# Patient Record
Sex: MALE | Race: WHITE | NOT HISPANIC OR LATINO | URBAN - METROPOLITAN AREA
[De-identification: names, ages, dates, MRNs, and addresses within clinical notes are randomized per-mention and may not be internally consistent; named-entity substitution may affect disease eponyms.]

---

## 2018-11-11 ENCOUNTER — INPATIENT (INPATIENT)
Facility: HOSPITAL | Age: 63
LOS: 4 days | Discharge: HOME | DRG: 470 | End: 2018-11-16
Attending: INTERNAL MEDICINE | Admitting: INTERNAL MEDICINE
Payer: COMMERCIAL

## 2018-11-11 VITALS — HEART RATE: 100 BPM | OXYGEN SATURATION: 97 % | TEMPERATURE: 97 F | RESPIRATION RATE: 18 BRPM

## 2018-11-11 DIAGNOSIS — S72.002A FRACTURE OF UNSPECIFIED PART OF NECK OF LEFT FEMUR, INITIAL ENCOUNTER FOR CLOSED FRACTURE: ICD-10-CM

## 2018-11-11 DIAGNOSIS — I10 ESSENTIAL (PRIMARY) HYPERTENSION: ICD-10-CM

## 2018-11-11 LAB
ABO RH CONFIRMATION: SIGNIFICANT CHANGE UP
ALBUMIN SERPL ELPH-MCNC: 4.4 G/DL — SIGNIFICANT CHANGE UP (ref 3.3–5.2)
ALP SERPL-CCNC: 70 U/L — SIGNIFICANT CHANGE UP (ref 40–120)
ALT FLD-CCNC: 22 U/L — SIGNIFICANT CHANGE UP
ANION GAP SERPL CALC-SCNC: 14 MMOL/L — SIGNIFICANT CHANGE UP (ref 5–17)
APTT BLD: 30.5 SEC — SIGNIFICANT CHANGE UP (ref 27.5–36.3)
AST SERPL-CCNC: 21 U/L — SIGNIFICANT CHANGE UP
BILIRUB SERPL-MCNC: 0.2 MG/DL — LOW (ref 0.4–2)
BLD GP AB SCN SERPL QL: SIGNIFICANT CHANGE UP
BUN SERPL-MCNC: 17 MG/DL — SIGNIFICANT CHANGE UP (ref 8–20)
CALCIUM SERPL-MCNC: 9.3 MG/DL — SIGNIFICANT CHANGE UP (ref 8.6–10.2)
CHLORIDE SERPL-SCNC: 98 MMOL/L — SIGNIFICANT CHANGE UP (ref 98–107)
CO2 SERPL-SCNC: 25 MMOL/L — SIGNIFICANT CHANGE UP (ref 22–29)
CREAT SERPL-MCNC: 0.79 MG/DL — SIGNIFICANT CHANGE UP (ref 0.5–1.3)
GLUCOSE SERPL-MCNC: 120 MG/DL — HIGH (ref 70–115)
HCT VFR BLD CALC: 42.1 % — SIGNIFICANT CHANGE UP (ref 42–52)
HGB BLD-MCNC: 14.3 G/DL — SIGNIFICANT CHANGE UP (ref 14–18)
INR BLD: 0.95 RATIO — SIGNIFICANT CHANGE UP (ref 0.88–1.16)
LYMPHOCYTES # BLD AUTO: 4 % — LOW (ref 20–55)
MCHC RBC-ENTMCNC: 32.1 PG — HIGH (ref 27–31)
MCHC RBC-ENTMCNC: 34 G/DL — SIGNIFICANT CHANGE UP (ref 32–36)
MCV RBC AUTO: 94.4 FL — HIGH (ref 80–94)
MONOCYTES NFR BLD AUTO: 1 % — LOW (ref 3–10)
NEUTROPHILS NFR BLD AUTO: 95 % — HIGH (ref 37–73)
PLAT MORPH BLD: NORMAL — SIGNIFICANT CHANGE UP
PLATELET # BLD AUTO: 194 K/UL — SIGNIFICANT CHANGE UP (ref 150–400)
POTASSIUM SERPL-MCNC: 4.1 MMOL/L — SIGNIFICANT CHANGE UP (ref 3.5–5.3)
POTASSIUM SERPL-SCNC: 4.1 MMOL/L — SIGNIFICANT CHANGE UP (ref 3.5–5.3)
PROT SERPL-MCNC: 6.9 G/DL — SIGNIFICANT CHANGE UP (ref 6.6–8.7)
PROTHROM AB SERPL-ACNC: 10.9 SEC — SIGNIFICANT CHANGE UP (ref 10–12.9)
RBC # BLD: 4.46 M/UL — LOW (ref 4.6–6.2)
RBC # FLD: 12.1 % — SIGNIFICANT CHANGE UP (ref 11–15.6)
RBC BLD AUTO: NORMAL — SIGNIFICANT CHANGE UP
SODIUM SERPL-SCNC: 137 MMOL/L — SIGNIFICANT CHANGE UP (ref 135–145)
TYPE + AB SCN PNL BLD: SIGNIFICANT CHANGE UP
WBC # BLD: 13.7 K/UL — HIGH (ref 4.8–10.8)
WBC # FLD AUTO: 13.7 K/UL — HIGH (ref 4.8–10.8)

## 2018-11-11 PROCEDURE — 72192 CT PELVIS W/O DYE: CPT | Mod: 26

## 2018-11-11 PROCEDURE — 73502 X-RAY EXAM HIP UNI 2-3 VIEWS: CPT | Mod: 26,LT

## 2018-11-11 PROCEDURE — 76377 3D RENDER W/INTRP POSTPROCES: CPT | Mod: 26

## 2018-11-11 PROCEDURE — 71045 X-RAY EXAM CHEST 1 VIEW: CPT | Mod: 26

## 2018-11-11 PROCEDURE — 99285 EMERGENCY DEPT VISIT HI MDM: CPT

## 2018-11-11 PROCEDURE — 99222 1ST HOSP IP/OBS MODERATE 55: CPT

## 2018-11-11 PROCEDURE — 93010 ELECTROCARDIOGRAM REPORT: CPT

## 2018-11-11 RX ORDER — SODIUM CHLORIDE 9 MG/ML
1000 INJECTION, SOLUTION INTRAVENOUS
Qty: 0 | Refills: 0 | Status: DISCONTINUED | OUTPATIENT
Start: 2018-11-12 | End: 2018-11-13

## 2018-11-11 RX ORDER — MORPHINE SULFATE 50 MG/1
4 CAPSULE, EXTENDED RELEASE ORAL ONCE
Qty: 0 | Refills: 0 | Status: DISCONTINUED | OUTPATIENT
Start: 2018-11-11 | End: 2018-11-11

## 2018-11-11 RX ORDER — ENOXAPARIN SODIUM 100 MG/ML
40 INJECTION SUBCUTANEOUS ONCE
Qty: 0 | Refills: 0 | Status: COMPLETED | OUTPATIENT
Start: 2018-11-11 | End: 2018-11-11

## 2018-11-11 RX ORDER — LOSARTAN/HYDROCHLOROTHIAZIDE 100MG-25MG
0 TABLET ORAL
Qty: 0 | Refills: 0 | COMMUNITY

## 2018-11-11 RX ORDER — CEFAZOLIN SODIUM 1 G
2000 VIAL (EA) INJECTION ONCE
Qty: 0 | Refills: 0 | Status: COMPLETED | OUTPATIENT
Start: 2018-11-12 | End: 2018-11-12

## 2018-11-11 RX ORDER — ONDANSETRON 8 MG/1
4 TABLET, FILM COATED ORAL EVERY 6 HOURS
Qty: 0 | Refills: 0 | Status: DISCONTINUED | OUTPATIENT
Start: 2018-11-11 | End: 2018-11-13

## 2018-11-11 RX ORDER — HYDROCHLOROTHIAZIDE 25 MG
25 TABLET ORAL DAILY
Qty: 0 | Refills: 0 | Status: DISCONTINUED | OUTPATIENT
Start: 2018-11-11 | End: 2018-11-13

## 2018-11-11 RX ORDER — LOSARTAN POTASSIUM 100 MG/1
100 TABLET, FILM COATED ORAL DAILY
Qty: 0 | Refills: 0 | Status: DISCONTINUED | OUTPATIENT
Start: 2018-11-11 | End: 2018-11-13

## 2018-11-11 RX ORDER — MORPHINE SULFATE 50 MG/1
2 CAPSULE, EXTENDED RELEASE ORAL EVERY 4 HOURS
Qty: 0 | Refills: 0 | Status: DISCONTINUED | OUTPATIENT
Start: 2018-11-11 | End: 2018-11-13

## 2018-11-11 RX ORDER — SODIUM CHLORIDE 9 MG/ML
3 INJECTION INTRAMUSCULAR; INTRAVENOUS; SUBCUTANEOUS EVERY 8 HOURS
Qty: 0 | Refills: 0 | Status: DISCONTINUED | OUTPATIENT
Start: 2018-11-11 | End: 2018-11-13

## 2018-11-11 RX ADMIN — ENOXAPARIN SODIUM 40 MILLIGRAM(S): 100 INJECTION SUBCUTANEOUS at 20:23

## 2018-11-11 RX ADMIN — MORPHINE SULFATE 4 MILLIGRAM(S): 50 CAPSULE, EXTENDED RELEASE ORAL at 17:38

## 2018-11-11 RX ADMIN — SODIUM CHLORIDE 3 MILLILITER(S): 9 INJECTION INTRAMUSCULAR; INTRAVENOUS; SUBCUTANEOUS at 22:12

## 2018-11-11 NOTE — ED ADULT NURSE NOTE - OBJECTIVE STATEMENT
Pt. presents with L. hip pain and limited ROM. Pt. was walking down wooden steps when the step broke and gave way, pt. fell on L. side and broke fall with L. hip. Pt. was unable to stand up and family called EMS. Denies head injury, LOC, syncope. Pt. is able to move toes and foot, unable to bear weight on L. leg, unable to bend leg without pain, leg extended pt. does not have pain. Pt. denies numbness, tingling in L. leg. Denies blood thinners.

## 2018-11-11 NOTE — CONSULT NOTE ADULT - SUBJECTIVE AND OBJECTIVE BOX
Pt Name: SB SAEED    MRN: [id number]      Patient is a 63y Male presenting to the emergency department with a chief complaint of pain to the left hip s/p fall.  Pt states he fell through rotted wooden step landing on his left hip.  Unable to weight bear.  Denies numbness/tingling.  Denies back pain, neck pain.  No other injuries.      .  REVIEW OF SYSTEMS      General:	    Skin/Breast: no rashes, no lacerations, no abrasions    Respiratory and Thorax: no difficulty breathing  	  Cardiovascular:	no chest pain    Gastrointestinal:	no abdominal pain, no nausea/vomiting    Musculoskeletal:	 see hpi    Neurological:	see hpi      ROS is otherwise negative.    PAST MEDICAL & SURGICAL HISTORY:  Hypertension  No significant past surgical history      Allergies: penicillin (Unknown)      Medications:  losartan/hydrochlorothiazide    FAMILY HISTORY:  : non-contributory    Social History: denies tobacco use, etoh    Ambulation: Walking independently [x ] With Cane [ ] With Walker [ ]  Bedbound [ ]                           14.3   13.7  )-----------( 194      ( 11 Nov 2018 17:33 )             42.1     11-11    137  |  98  |  17.0  ----------------------------<  120<H>  4.1   |  25.0  |  0.79    Ca    9.3      11 Nov 2018 17:33    TPro  6.9  /  Alb  4.4  /  TBili  0.2<L>  /  DBili  x   /  AST  21  /  ALT  22  /  AlkPhos  70  11-11      PHYSICAL EXAM:    Vital Signs Last 24 Hrs  T(C): 36.5 (11 Nov 2018 15:19), Max: 36.5 (11 Nov 2018 15:19)  T(F): 97.7 (11 Nov 2018 15:19), Max: 97.7 (11 Nov 2018 15:19)  HR: 91 (11 Nov 2018 15:19) (91 - 91)  BP: 135/86 (11 Nov 2018 15:19) (135/86 - 135/86)  BP(mean): --  RR: 16 (11 Nov 2018 15:19) (16 - 16)  SpO2: 99% (11 Nov 2018 15:19) (99% - 99%)  Daily Height in cm: 182.88 (11 Nov 2018 15:19)    Daily     Appearance: Alert, responsive, in no acute distress.    Neurological: Sensation is grossly intact to light touch. 5/5 motor function of all extremities. No focal deficits or weaknesses found.    Skin: no rash on visible skin. Skin is clean, dry and intact. No bleeding. No abrasions. No ulcerations.  LLE: no obvious deformity, +dorsiflexion, no weakness noted, toe movement intact, sensation intact, cap refill brisk, rom to left hip not tested secondary to pain    Vascular: 2+ distal pulses. Cap refill < 2 sec. No signs of venous insuffiency or stasis. No extremity ulcerations. No cyanosis.        Imaging Studies: left hip/pelvis xray:  Left femoral neck fx    A/P:  Pt is a  63y Male with left femoral neck fx    PLAN:   *CT scan left hip   * Admit to medicine service  * NPO for OR tomorrow  * IV fluids ordered and to start once NPO  * Pre-operative ABX ordered  *Routine daily anticoagulation held for OR  * Single dose anticoaguation ordered  * Medical clearance requested for procedure  discussed with Dr. Fierro  * Bed rest

## 2018-11-11 NOTE — ED ADULT NURSE REASSESSMENT NOTE - NS ED NURSE REASSESS COMMENT FT1
spoke with Timmy christina and ok to given Lovenox now
verbal report given to Rubi in holding room and patient transferred to CDU 3L
patient rec'd at this time patient alert and coopertive sl to left ac intact. postive pedal pulse toes warm and mobile.  family at bedside and aware that patient having surgery in am. food provided by family v/s taken and charted will monitor and chart changes

## 2018-11-11 NOTE — ED ADULT NURSE NOTE - NSIMPLEMENTINTERV_GEN_ALL_ED
Implemented All Fall Risk Interventions:  [place] to call system. Call bell, personal items and telephone within reach. Instruct patient to call for assistance. Room bathroom lighting operational. Non-slip footwear when patient is off stretcher. Physically safe environment: no spills, clutter or unnecessary equipment. Stretcher in lowest position, wheels locked, appropriate side rails in place. Provide visual cue, wrist band, yellow gown, etc. Monitor gait and stability. Monitor for mental status changes and reorient to person, place, and time. Review medications for side effects contributing to fall risk. Reinforce activity limits and safety measures with patient and family.

## 2018-11-11 NOTE — H&P ADULT - HISTORY OF PRESENT ILLNESS
64 y/o male with history of controlled HTN. Patient was in his usual state of health until earlier today he was walking off his deck and his left foot fell through a rotted step causing him to fall and land on his left side. He had immediate pain and inability to walk due to pain on left side. he did not hit his head and had no other injuries. In the ED he was found to have left femoral neck fx. Denies any recent illnesses, fever, chills, N/V, SOB. Has normal exercise tolerance. Seen by Orthopedic service and will need OR repair of fracture.

## 2018-11-11 NOTE — ED ADULT TRIAGE NOTE - CHIEF COMPLAINT QUOTE
Pt brought in by ambulance from home s/p fall. Pt states that the step he was standing on broke under him and fell down two additional steps. Pt denies any head trauma or LOC. No blood thinners. Unable to weight bear on left thigh and hip pain s/p fall.

## 2018-11-11 NOTE — ED PROVIDER NOTE - OBJECTIVE STATEMENT
64 yo male hx of HTN only presents with acute 10/10 left hip pain, aching, +radiating down left leg, +worse with movement; patient fell through a rotten wood step on back patio that collapsed under him, landed all weight on left hip; unable to stand up, unable to ambulate, increased pain with movement; no other injuries, no other complaints

## 2018-11-11 NOTE — H&P ADULT - PROBLEM SELECTOR PLAN 1
Admit to medical bed, NPO after MN for OR in AM, IVF, pain control, spirometer, VC boots for DVT-P, Fall/Fracture precautions. Patient with no ischemic changes on EKG, clear CXR, normal vitals, labs, normal CVS exam, and exercise tolerance of at last 6-7 <METS. No indication for any further pre-operative testing and patient may proceed to OR as scheduled. He is Low risk for moderate risk Surgery. Discussed with ortho and Family at bedside.

## 2018-11-11 NOTE — ED ADULT NURSE NOTE - MUSCULOSKELETAL WDL
Full range of motion of ELMER and R. lower extremities. LLE limited ROM, full extension and external rotation comfortable. Pt. unable to bend leg without pain. Pt. able to move, rotate L. foot. Pt. denies numbness, tingling. Bilateral pedal pulses present. Full range of motion of ELMER and R. lower extremities. LLE limited ROM, full extension and external rotation comfortable. Pt. unable to bend leg without pain. Pt. able to move, rotate L. foot. Pt. denies numbness, tingling. Bilateral pedal pulses present. L. leg swollen.

## 2018-11-12 ENCOUNTER — TRANSCRIPTION ENCOUNTER (OUTPATIENT)
Age: 63
End: 2018-11-12

## 2018-11-12 DIAGNOSIS — D72.829 ELEVATED WHITE BLOOD CELL COUNT, UNSPECIFIED: ICD-10-CM

## 2018-11-12 DIAGNOSIS — Z29.9 ENCOUNTER FOR PROPHYLACTIC MEASURES, UNSPECIFIED: ICD-10-CM

## 2018-11-12 PROCEDURE — 99233 SBSQ HOSP IP/OBS HIGH 50: CPT

## 2018-11-12 RX ORDER — HEPARIN SODIUM 5000 [USP'U]/ML
5000 INJECTION INTRAVENOUS; SUBCUTANEOUS EVERY 12 HOURS
Qty: 0 | Refills: 0 | Status: DISCONTINUED | OUTPATIENT
Start: 2018-11-12 | End: 2018-11-12

## 2018-11-12 RX ORDER — ENOXAPARIN SODIUM 100 MG/ML
40 INJECTION SUBCUTANEOUS ONCE
Qty: 0 | Refills: 0 | Status: COMPLETED | OUTPATIENT
Start: 2018-11-12 | End: 2018-11-12

## 2018-11-12 RX ADMIN — SODIUM CHLORIDE 3 MILLILITER(S): 9 INJECTION INTRAMUSCULAR; INTRAVENOUS; SUBCUTANEOUS at 23:28

## 2018-11-12 RX ADMIN — SODIUM CHLORIDE 3 MILLILITER(S): 9 INJECTION INTRAMUSCULAR; INTRAVENOUS; SUBCUTANEOUS at 05:05

## 2018-11-12 RX ADMIN — LOSARTAN POTASSIUM 100 MILLIGRAM(S): 100 TABLET, FILM COATED ORAL at 05:03

## 2018-11-12 RX ADMIN — SODIUM CHLORIDE 3 MILLILITER(S): 9 INJECTION INTRAMUSCULAR; INTRAVENOUS; SUBCUTANEOUS at 13:45

## 2018-11-12 RX ADMIN — ENOXAPARIN SODIUM 40 MILLIGRAM(S): 100 INJECTION SUBCUTANEOUS at 12:24

## 2018-11-12 RX ADMIN — Medication 25 MILLIGRAM(S): at 05:03

## 2018-11-12 NOTE — PROGRESS NOTE ADULT - SUBJECTIVE AND OBJECTIVE BOX
The  patient is a 63y old male who presents with a complaint of left hip pain.  (12 Nov 2018 12:31)  He was using a leaf blower in his back yard walking down some steps when he stepped on a   rotten piece of wood that gave way and he tumbled to the ground falling on his left hip  sustaining a nondisplaced left femoral; neck fracture.  The bony pelvis is intact.   He is scheduled to have a LEFT HIP TOTAL JOINT REPLACEMENT.    PAST MEDICAL HISTORY:  Hypertension x 10 years.      PAST SURGICAL HISTORY:  Tonsillectomy when he was in the 4th grade.      MEDICATIONS  (STANDING):  ceFAZolin   IVPB 2000 milliGRAM(s) IV Intermittent once  hydrochlorothiazide 25 milliGRAM(s) Oral daily  lactated ringers. 1000 milliLiter(s) (80 mL/Hr) IV Continuous <Continuous>  losartan 100 milliGRAM(s) Oral daily  sodium chloride 0.9% lock flush 3 milliLiter(s) IV Push every 8 hours    MEDICATIONS  (PRN):  morphine  - Injectable 2 milliGRAM(s) IV Push every 4 hours PRN Moderate Pain (4 - 6)  ondansetron Injectable 4 milliGRAM(s) IV Push every 6 hours PRN Nausea      Allergies:     penicillin (Unknown)      SOCIAL HISTORY:  He occasionally will drink a glass of Guinness now and then and smoke an                               occasional cigarette.  He never used illicit drugs.                      14.3   13.7  )-----------( 194      ( 11 Nov 2018 17:33 )             42.1     PT/INR - ( 11 Nov 2018 17:33 )   PT: 10.9 sec;   INR: 0.95 ratio       PTT - ( 11 Nov 2018 17:33 )  PTT:30.5 sec    11-11    137  |  98  |  17.0  ----------------------------<  120<H>  4.1   |  25.0  |  0.79    Ca    9.3      11 Nov 2018 17:33    TPro  6.9  /  Alb  4.4  /  TBili  0.2<L>  /  DBili  x   /  AST  21  /  ALT  22  /  AlkPhos  70  11-11    EKG:  -  11/12/2018  Sinus tachycardia  Normal E.K.G.    X-RAY CHEST  -  11/11/2018  There is a nondisplaced left femoral neck fracture.  The bony pelvis is intact.    ASA # = 3   Mallampati # = 3

## 2018-11-12 NOTE — PROGRESS NOTE ADULT - SUBJECTIVE AND OBJECTIVE BOX
SB SAEED    [id number]    63y      Male    CC: Left Hip fracture     INTERVAL HPI/OVERNIGHT EVENTS:  64 y/o male with history of controlled HTN. Patient was in his usual state of health until he was walking off his deck and his left foot fell through a rotted step causing him to fall and land on his left side. He had immediate pain and inability to walk due to pain on left side. he did not hit his head and had no other injuries. In the ED he was found to have left femoral neck fx.     pt today states pain is controlled. no sob no chest pain no abd pain    REVIEW OF SYSTEMS:    CONSTITUTIONAL: No fever, weight loss, or fatigue  RESPIRATORY: No cough, wheezing, hemoptysis; No shortness of breath  CARDIOVASCULAR: No chest pain, palpitations  GASTROINTESTINAL: No abdominal or epigastric pain. No nausea, vomiting      Vital Signs Last 24 Hrs  T(C): 36.9 (12 Nov 2018 08:18), Max: 37.1 (11 Nov 2018 19:47)  T(F): 98.4 (12 Nov 2018 08:18), Max: 98.7 (11 Nov 2018 19:47)  HR: 90 (12 Nov 2018 08:18) (76 - 96)  BP: 129/80 (12 Nov 2018 08:18) (123/74 - 135/86)  BP(mean): 96 (11 Nov 2018 21:53) (96 - 96)  RR: 18 (12 Nov 2018 08:18) (16 - 18)  SpO2: 99% (12 Nov 2018 08:18) (96% - 108%)    PHYSICAL EXAM:    GENERAL: NAD, well-groomed  HEENT: PERRL, +EOMI  CHEST/LUNG: Clear to auscultation bilaterally; No wheezing  HEART: S1S2+, Regular rate and rhythm; No murmurs  ABDOMEN: Soft, Nontender, Nondistended; Bowel sounds present  EXTREMITIES:  2+ Peripheral Pulses, No clubbing, cyanosis, or edema      LABS:                        14.3   13.7  )-----------( 194      ( 11 Nov 2018 17:33 )             42.1     11-11    137  |  98  |  17.0  ----------------------------<  120<H>  4.1   |  25.0  |  0.79    Ca    9.3      11 Nov 2018 17:33    TPro  6.9  /  Alb  4.4  /  TBili  0.2<L>  /  DBili  x   /  AST  21  /  ALT  22  /  AlkPhos  70  11-11    PT/INR - ( 11 Nov 2018 17:33 )   PT: 10.9 sec;   INR: 0.95 ratio         PTT - ( 11 Nov 2018 17:33 )  PTT:30.5 sec        MEDICATIONS  (STANDING):  ceFAZolin   IVPB 2000 milliGRAM(s) IV Intermittent once  hydrochlorothiazide 25 milliGRAM(s) Oral daily  lactated ringers. 1000 milliLiter(s) (80 mL/Hr) IV Continuous <Continuous>  losartan 100 milliGRAM(s) Oral daily  sodium chloride 0.9% lock flush 3 milliLiter(s) IV Push every 8 hours    MEDICATIONS  (PRN):  morphine  - Injectable 2 milliGRAM(s) IV Push every 4 hours PRN Moderate Pain (4 - 6)  ondansetron Injectable 4 milliGRAM(s) IV Push every 6 hours PRN Nausea

## 2018-11-12 NOTE — PROGRESS NOTE ADULT - ASSESSMENT
64 y/o male with history of controlled HTN. Patient was in his usual state of health until he was walking off his deck and his left foot fell through a rotted step causing him to fall and land on his left side. He had immediate pain and inability to walk due to pain on left side. he did not hit his head and had no other injuries. In the ED he was found to have left femoral neck fx.

## 2018-11-12 NOTE — PROGRESS NOTE ADULT - PROBLEM SELECTOR PLAN 2
Likely reactive for fracture  - monitor level   - no fevers no, no sx at this time to indicate infection

## 2018-11-12 NOTE — PROGRESS NOTE ADULT - PROBLEM SELECTOR PLAN 1
Patient with no ischemic changes on EKG, clear CXR, normal vitals, labs, normal CVS exam, and exercise tolerance of at last 6-7 <METS. No indication for any further pre-operative testing and patient may proceed to OR as scheduled. He is Low risk for moderate risk Surgery  - for OR 11/13/18  - ortho following

## 2018-11-13 ENCOUNTER — RESULT REVIEW (OUTPATIENT)
Age: 63
End: 2018-11-13

## 2018-11-13 ENCOUNTER — TRANSCRIPTION ENCOUNTER (OUTPATIENT)
Age: 63
End: 2018-11-13

## 2018-11-13 LAB
ANION GAP SERPL CALC-SCNC: 16 MMOL/L — SIGNIFICANT CHANGE UP (ref 5–17)
BUN SERPL-MCNC: 7 MG/DL — LOW (ref 8–20)
CALCIUM SERPL-MCNC: 9.3 MG/DL — SIGNIFICANT CHANGE UP (ref 8.6–10.2)
CHLORIDE SERPL-SCNC: 103 MMOL/L — SIGNIFICANT CHANGE UP (ref 98–107)
CO2 SERPL-SCNC: 25 MMOL/L — SIGNIFICANT CHANGE UP (ref 22–29)
CREAT SERPL-MCNC: 0.82 MG/DL — SIGNIFICANT CHANGE UP (ref 0.5–1.3)
GLUCOSE BLDC GLUCOMTR-MCNC: 126 MG/DL — HIGH (ref 70–99)
GLUCOSE BLDC GLUCOMTR-MCNC: 90 MG/DL — SIGNIFICANT CHANGE UP (ref 70–99)
GLUCOSE SERPL-MCNC: 120 MG/DL — HIGH (ref 70–115)
HCT VFR BLD CALC: 41.5 % — LOW (ref 42–52)
HGB BLD-MCNC: 14.2 G/DL — SIGNIFICANT CHANGE UP (ref 14–18)
MAGNESIUM SERPL-MCNC: 2.2 MG/DL — SIGNIFICANT CHANGE UP (ref 1.6–2.6)
MCHC RBC-ENTMCNC: 32.9 PG — HIGH (ref 27–31)
MCHC RBC-ENTMCNC: 34.2 G/DL — SIGNIFICANT CHANGE UP (ref 32–36)
MCV RBC AUTO: 96.1 FL — HIGH (ref 80–94)
PLATELET # BLD AUTO: 166 K/UL — SIGNIFICANT CHANGE UP (ref 150–400)
POTASSIUM SERPL-MCNC: 4.4 MMOL/L — SIGNIFICANT CHANGE UP (ref 3.5–5.3)
POTASSIUM SERPL-SCNC: 4.4 MMOL/L — SIGNIFICANT CHANGE UP (ref 3.5–5.3)
RBC # BLD: 4.32 M/UL — LOW (ref 4.6–6.2)
RBC # FLD: 12.3 % — SIGNIFICANT CHANGE UP (ref 11–15.6)
SODIUM SERPL-SCNC: 144 MMOL/L — SIGNIFICANT CHANGE UP (ref 135–145)
WBC # BLD: 8.4 K/UL — SIGNIFICANT CHANGE UP (ref 4.8–10.8)
WBC # FLD AUTO: 8.4 K/UL — SIGNIFICANT CHANGE UP (ref 4.8–10.8)

## 2018-11-13 PROCEDURE — 73501 X-RAY EXAM HIP UNI 1 VIEW: CPT | Mod: 26,LT

## 2018-11-13 PROCEDURE — 88311 DECALCIFY TISSUE: CPT | Mod: 26

## 2018-11-13 PROCEDURE — 27130 TOTAL HIP ARTHROPLASTY: CPT | Mod: LT

## 2018-11-13 PROCEDURE — 27130 TOTAL HIP ARTHROPLASTY: CPT | Mod: AS,LT

## 2018-11-13 PROCEDURE — 88305 TISSUE EXAM BY PATHOLOGIST: CPT | Mod: 26

## 2018-11-13 PROCEDURE — 99232 SBSQ HOSP IP/OBS MODERATE 35: CPT

## 2018-11-13 RX ORDER — SODIUM CHLORIDE 9 MG/ML
1000 INJECTION, SOLUTION INTRAVENOUS
Qty: 0 | Refills: 0 | Status: DISCONTINUED | OUTPATIENT
Start: 2018-11-13 | End: 2018-11-13

## 2018-11-13 RX ORDER — GABAPENTIN 400 MG/1
600 CAPSULE ORAL ONCE
Qty: 0 | Refills: 0 | Status: COMPLETED | OUTPATIENT
Start: 2018-11-13 | End: 2018-11-13

## 2018-11-13 RX ORDER — KETOROLAC TROMETHAMINE 30 MG/ML
15 SYRINGE (ML) INJECTION EVERY 8 HOURS
Qty: 0 | Refills: 0 | Status: DISCONTINUED | OUTPATIENT
Start: 2018-11-13 | End: 2018-11-16

## 2018-11-13 RX ORDER — TRANEXAMIC ACID 100 MG/ML
930 INJECTION, SOLUTION INTRAVENOUS ONCE
Qty: 0 | Refills: 0 | Status: DISCONTINUED | OUTPATIENT
Start: 2018-11-13 | End: 2018-11-13

## 2018-11-13 RX ORDER — ENOXAPARIN SODIUM 100 MG/ML
40 INJECTION SUBCUTANEOUS DAILY
Qty: 0 | Refills: 0 | Status: DISCONTINUED | OUTPATIENT
Start: 2018-11-14 | End: 2018-11-16

## 2018-11-13 RX ORDER — CEFAZOLIN SODIUM 1 G
2000 VIAL (EA) INJECTION ONCE
Qty: 0 | Refills: 0 | Status: DISCONTINUED | OUTPATIENT
Start: 2018-11-13 | End: 2018-11-13

## 2018-11-13 RX ORDER — FENTANYL CITRATE 50 UG/ML
50 INJECTION INTRAVENOUS
Qty: 0 | Refills: 0 | Status: DISCONTINUED | OUTPATIENT
Start: 2018-11-13 | End: 2018-11-13

## 2018-11-13 RX ORDER — CEFAZOLIN SODIUM 1 G
2000 VIAL (EA) INJECTION
Qty: 0 | Refills: 0 | Status: COMPLETED | OUTPATIENT
Start: 2018-11-13 | End: 2018-11-14

## 2018-11-13 RX ORDER — ACETAMINOPHEN 500 MG
975 TABLET ORAL EVERY 8 HOURS
Qty: 0 | Refills: 0 | Status: DISCONTINUED | OUTPATIENT
Start: 2018-11-13 | End: 2018-11-16

## 2018-11-13 RX ORDER — HYDROMORPHONE HYDROCHLORIDE 2 MG/ML
0.5 INJECTION INTRAMUSCULAR; INTRAVENOUS; SUBCUTANEOUS
Qty: 0 | Refills: 0 | Status: DISCONTINUED | OUTPATIENT
Start: 2018-11-13 | End: 2018-11-16

## 2018-11-13 RX ORDER — OXYCODONE HYDROCHLORIDE 5 MG/1
5 TABLET ORAL
Qty: 0 | Refills: 0 | Status: DISCONTINUED | OUTPATIENT
Start: 2018-11-13 | End: 2018-11-16

## 2018-11-13 RX ORDER — OXYCODONE HYDROCHLORIDE 5 MG/1
20 TABLET ORAL ONCE
Qty: 0 | Refills: 0 | Status: DISCONTINUED | OUTPATIENT
Start: 2018-11-13 | End: 2018-11-13

## 2018-11-13 RX ORDER — MAGNESIUM HYDROXIDE 400 MG/1
30 TABLET, CHEWABLE ORAL
Qty: 0 | Refills: 0 | Status: DISCONTINUED | OUTPATIENT
Start: 2018-11-13 | End: 2018-11-16

## 2018-11-13 RX ORDER — DOCUSATE SODIUM 100 MG
100 CAPSULE ORAL THREE TIMES A DAY
Qty: 0 | Refills: 0 | Status: DISCONTINUED | OUTPATIENT
Start: 2018-11-13 | End: 2018-11-16

## 2018-11-13 RX ORDER — SODIUM CHLORIDE 9 MG/ML
1000 INJECTION INTRAMUSCULAR; INTRAVENOUS; SUBCUTANEOUS
Qty: 0 | Refills: 0 | Status: DISCONTINUED | OUTPATIENT
Start: 2018-11-13 | End: 2018-11-15

## 2018-11-13 RX ORDER — OXYCODONE HYDROCHLORIDE 5 MG/1
20 TABLET ORAL EVERY 12 HOURS
Qty: 0 | Refills: 0 | Status: DISCONTINUED | OUTPATIENT
Start: 2018-11-13 | End: 2018-11-15

## 2018-11-13 RX ORDER — SENNA PLUS 8.6 MG/1
2 TABLET ORAL AT BEDTIME
Qty: 0 | Refills: 0 | Status: DISCONTINUED | OUTPATIENT
Start: 2018-11-13 | End: 2018-11-16

## 2018-11-13 RX ORDER — ONDANSETRON 8 MG/1
4 TABLET, FILM COATED ORAL ONCE
Qty: 0 | Refills: 0 | Status: DISCONTINUED | OUTPATIENT
Start: 2018-11-13 | End: 2018-11-13

## 2018-11-13 RX ORDER — OXYCODONE HYDROCHLORIDE 5 MG/1
10 TABLET ORAL
Qty: 0 | Refills: 0 | Status: DISCONTINUED | OUTPATIENT
Start: 2018-11-13 | End: 2018-11-16

## 2018-11-13 RX ORDER — VANCOMYCIN HCL 1 G
1500 VIAL (EA) INTRAVENOUS ONCE
Qty: 0 | Refills: 0 | Status: COMPLETED | OUTPATIENT
Start: 2018-11-13 | End: 2018-11-13

## 2018-11-13 RX ORDER — BUPIVACAINE 13.3 MG/ML
20 INJECTION, SUSPENSION, LIPOSOMAL INFILTRATION ONCE
Qty: 0 | Refills: 0 | Status: DISCONTINUED | OUTPATIENT
Start: 2018-11-13 | End: 2018-11-13

## 2018-11-13 RX ORDER — VANCOMYCIN HCL 1 G
1500 VIAL (EA) INTRAVENOUS
Qty: 0 | Refills: 0 | Status: COMPLETED | OUTPATIENT
Start: 2018-11-13 | End: 2018-11-13

## 2018-11-13 RX ORDER — POLYETHYLENE GLYCOL 3350 17 G/17G
17 POWDER, FOR SOLUTION ORAL DAILY
Qty: 0 | Refills: 0 | Status: DISCONTINUED | OUTPATIENT
Start: 2018-11-13 | End: 2018-11-16

## 2018-11-13 RX ORDER — DIPHENHYDRAMINE HCL 50 MG
25 CAPSULE ORAL AT BEDTIME
Qty: 0 | Refills: 0 | Status: DISCONTINUED | OUTPATIENT
Start: 2018-11-13 | End: 2018-11-16

## 2018-11-13 RX ORDER — ONDANSETRON 8 MG/1
4 TABLET, FILM COATED ORAL EVERY 6 HOURS
Qty: 0 | Refills: 0 | Status: DISCONTINUED | OUTPATIENT
Start: 2018-11-13 | End: 2018-11-16

## 2018-11-13 RX ORDER — CELECOXIB 200 MG/1
400 CAPSULE ORAL ONCE
Qty: 0 | Refills: 0 | Status: COMPLETED | OUTPATIENT
Start: 2018-11-13 | End: 2018-11-13

## 2018-11-13 RX ORDER — ACETAMINOPHEN 500 MG
1000 TABLET ORAL ONCE
Qty: 0 | Refills: 0 | Status: DISCONTINUED | OUTPATIENT
Start: 2018-11-13 | End: 2018-11-13

## 2018-11-13 RX ADMIN — Medication 975 MILLIGRAM(S): at 22:44

## 2018-11-13 RX ADMIN — Medication 300 MILLIGRAM(S): at 09:40

## 2018-11-13 RX ADMIN — SODIUM CHLORIDE 3 MILLILITER(S): 9 INJECTION INTRAMUSCULAR; INTRAVENOUS; SUBCUTANEOUS at 05:08

## 2018-11-13 RX ADMIN — CELECOXIB 400 MILLIGRAM(S): 200 CAPSULE ORAL at 09:29

## 2018-11-13 RX ADMIN — Medication 100 MILLIGRAM(S): at 19:45

## 2018-11-13 RX ADMIN — OXYCODONE HYDROCHLORIDE 20 MILLIGRAM(S): 5 TABLET ORAL at 09:29

## 2018-11-13 RX ADMIN — OXYCODONE HYDROCHLORIDE 20 MILLIGRAM(S): 5 TABLET ORAL at 18:13

## 2018-11-13 RX ADMIN — Medication 15 MILLIGRAM(S): at 23:43

## 2018-11-13 RX ADMIN — Medication 25 MILLIGRAM(S): at 05:11

## 2018-11-13 RX ADMIN — LOSARTAN POTASSIUM 100 MILLIGRAM(S): 100 TABLET, FILM COATED ORAL at 05:12

## 2018-11-13 RX ADMIN — FENTANYL CITRATE 50 MICROGRAM(S): 50 INJECTION INTRAVENOUS at 15:20

## 2018-11-13 RX ADMIN — Medication 100 MILLIGRAM(S): at 22:44

## 2018-11-13 RX ADMIN — FENTANYL CITRATE 50 MICROGRAM(S): 50 INJECTION INTRAVENOUS at 15:36

## 2018-11-13 RX ADMIN — GABAPENTIN 600 MILLIGRAM(S): 400 CAPSULE ORAL at 09:28

## 2018-11-13 RX ADMIN — Medication 300 MILLIGRAM(S): at 23:57

## 2018-11-13 RX ADMIN — Medication 15 MILLIGRAM(S): at 22:43

## 2018-11-13 RX ADMIN — Medication 975 MILLIGRAM(S): at 23:44

## 2018-11-13 NOTE — DISCHARGE NOTE ADULT - MEDICATION SUMMARY - MEDICATIONS TO STOP TAKING
I will STOP taking the medications listed below when I get home from the hospital:    hydrochlorothiazide-losartan  -- 80-25    losartan-hydroCHLOROthiazide 100 mg-25 mg oral tablet  -- 1 tab(s) by mouth once a day

## 2018-11-13 NOTE — PROGRESS NOTE ADULT - SUBJECTIVE AND OBJECTIVE BOX
Orthopedic PA Postop Note  Patient S/P LEFT MAHNAZ  Patient in bed comfortable   LEFT Leg  Dressing C/D/I  DP Pulse intact  Calf Soft NT  Dorsi/Plantar Flexion/EHL/FHL Intact  Sensation intact to light touch  Abduction Pillow in place     Vital Signs Last 24 Hrs  T(C): 36.7 (13 Nov 2018 19:48), Max: 37.2 (13 Nov 2018 14:05)  T(F): 98 (13 Nov 2018 19:48), Max: 99 (13 Nov 2018 14:05)  HR: 87 (13 Nov 2018 19:48) (68 - 97)  BP: 121/73 (13 Nov 2018 19:48) (115/59 - 146/75)  BP(mean): 98 (13 Nov 2018 14:50) (81 - 98)  RR: 18 (13 Nov 2018 19:48) (14 - 19)  SpO2: 98% (13 Nov 2018 19:48) (95% - 100%)    < from: Xray Hip w/ Pelvis 1 View, Left (11.13.18 @ 14:10) >     EXAM:  XR HIP WITH PELV 1V LT                          PROCEDURE DATE:  11/13/2018          INTERPRETATION:  X-RAY OF LEFT HIP.    History: Postoperative.    Date and time of exam: 11/13/2018.    Technique: 2 views were obtained.    Findings: Status post left total hip replacement. Alignment appears to be   anatomic..    Impression:  Postoperative changes.                STALIN GRIDER M.D., ATTENDING RADIOLOGIST  This document has been electronically signed. Nov 13 2018  3:57PM    < end of copied text >      A/P: 63M S/P LEFT MAHNAZ  1. DVTP - LOVENOX  2. Physical Therapy - Posterior Precautions  3. Pain Control as clinically indicated

## 2018-11-13 NOTE — DISCHARGE NOTE ADULT - HOSPITAL COURSE
62 y/o male with history of controlled HTN. Patient was in his usual state of health until he was walking off his deck and his left foot fell through a rotted step causing him to fall and land on his left side. He had immediate pain and inability to walk due to pain on left side. he did not hit his head and had no other injuries. In the ED he was found to have left femoral neck fx.      Problem/Plan - 1:  ·  Problem: Closed fracture of left hip, initial encounter.  Plan: - s/p ORIF 11/13/18  - ortho following and cleared for discharge follow up as outpatient  - PT evaluated can go home with home PT.      Problem/Plan - 2:  ·  Problem: Anemia due to blood loss.  Plan: expected post operation  - H/H has been stable yesterday and today, no evidence of acute bleed.      Problem/Plan - 3:  ·  Problem: Leukocytosis.  Plan: resolved.      Problem/Plan - 4:  ·  Problem: Essential hypertension.  Plan: - c/w losartan and HCTZ  - monitor.      Problem/Plan - 5:  ·  Problem: Prophylactic measure.  Plan: DVT ppx: lovenox SQ.     pt stable for discharge    total time spent for discharge 32 minutes

## 2018-11-13 NOTE — PHYSICAL THERAPY INITIAL EVALUATION ADULT - PERTINENT HX OF CURRENT PROBLEM, REHAB EVAL
Pt presents to  with s/p trip on step, resulting in pain to left thigh with inability to bear weight or ambulate. Pt found to have a femoral neck fx.

## 2018-11-13 NOTE — PROGRESS NOTE ADULT - SUBJECTIVE AND OBJECTIVE BOX
Pelvis & hip films reviewed. Implants are in appropriate position. No fracture or dislocation noted. Patient is WBAT of the surgical extremity.

## 2018-11-13 NOTE — DISCHARGE NOTE ADULT - CARE PLAN
Principal Discharge DX:	Closed fracture of left hip, initial encounter  Goal:	s/p srugical repair  Assessment and plan of treatment:	follow up with ortho in 1-2 weeks  pain meds as needed  continue Lovenox shots for 1 month to prevent blood clot development in legs  Secondary Diagnosis:	Anemia due to blood loss  Assessment and plan of treatment:	stable  Secondary Diagnosis:	Essential hypertension  Assessment and plan of treatment:	hold blood pressure meds since blood pressure has been low off meds and follow up with PCP to recheck pressures

## 2018-11-13 NOTE — BRIEF OPERATIVE NOTE - PROCEDURE
<<-----Click on this checkbox to enter Procedure Total hip arthroplasty  11/13/2018    Active  ADITYA

## 2018-11-13 NOTE — BRIEF OPERATIVE NOTE - POST-OP DX
Closed fracture of neck of left femur, initial encounter  11/13/2018    Active  Nett, Salvatore GONZALEZ

## 2018-11-13 NOTE — DISCHARGE NOTE ADULT - ADDITIONAL INSTRUCTIONS
ORTHOPEDIC FOLLOW UP RECOMMENDATIONS: The patient will be seen in the office between 2-3 weeks for wound check. PLEASE CONTACT OFFICE TO ARRANGE FOLLOW-UP APPOINTMENT DATE. Sutures/Staples/Tape will be removed at that time. Patient may shower after post-op day #5 (11/18/18). The dressing is to be removed on post op day #9 (11/22/18). IF THE DRESSING BECOMES SOILED BEFORE THE REMOVAL DATE, CHANGE WITH A SIMILAR DRESSING. IF THE DRESSING BECOMES STAINED WITH DISCHARGE, CONTACT THE OFFICE FOR FURTHER DIRECTIONS.  The patient will contact the office if the wound becomes red, has increasing pain, develops bleeding or discharge, an injury occurs, or has other concerns. The patient will continue PT consistent with posterior total hip replacement protocol. The patient will continue to practice posterior total hip precautions for a minimum of 6 week. The patient will continue LOVENOX for 4 weeks for blood clot prevention. The patient will take OXYCODONE AND TYLENOL for pain control and titrate according to prescription and patient needs. The patient will take Senna-S while taking oxycodone to prevent narcotic associated constipation.  Additionally, increase water intake (drink at least 8 glasses of water daily) and try adding fiber to the diet by eating fruits, vegetables and foods that are rich in grains. If constipation is experienced, contact the medical/primary care provider to discuss further treatment options. The patient is FULL weight bearing. ORTHOPEDIC FOLLOW UP RECOMMENDATIONS: The patient will be seen in the office between 2-3 weeks for wound check. PLEASE CONTACT OFFICE TO ARRANGE FOLLOW-UP APPOINTMENT DATE. Tape will be removed at that time. Patient may shower after post-op day #5 (11/18/18). The dressing is to be removed on post op day #9 (11/22/18). IF THE DRESSING BECOMES SOILED BEFORE THE REMOVAL DATE, CHANGE WITH A SIMILAR DRESSING. IF THE DRESSING BECOMES STAINED WITH DISCHARGE, CONTACT THE OFFICE FOR FURTHER DIRECTIONS.  The patient will contact the office if the wound becomes red, has increasing pain, develops bleeding or discharge, an injury occurs, or has other concerns. The patient will continue PT consistent with posterior total hip replacement protocol. The patient will continue to practice posterior total hip precautions for a minimum of 6 week. The patient will continue LOVENOX for 4 weeks for blood clot prevention. The patient will take OXYCODONE AND TYLENOL for pain control and titrate according to prescription and patient needs. The patient will take Senna-S while taking oxycodone to prevent narcotic associated constipation.  Additionally, increase water intake (drink at least 8 glasses of water daily) and try adding fiber to the diet by eating fruits, vegetables and foods that are rich in grains. If constipation is experienced, contact the medical/primary care provider to discuss further treatment options. The patient is FULL weight bearing. ORTHOPEDIC FOLLOW UP RECOMMENDATIONS: The patient will be seen in the office between 2-3 weeks for wound check. PLEASE CONTACT OFFICE TO ARRANGE FOLLOW-UP APPOINTMENT DATE. Tape will be removed at that time. Patient may shower after post-op day #5 (11/18/18). The dressing is to be removed on post op day #9 (11/22/18). IF THE DRESSING BECOMES SOILED BEFORE THE REMOVAL DATE, CHANGE WITH A SIMILAR DRESSING. IF THE DRESSING BECOMES STAINED WITH DISCHARGE, CONTACT THE OFFICE FOR FURTHER DIRECTIONS.  The patient will contact the office if the wound becomes red, has increasing pain, develops bleeding or discharge, an injury occurs, or has other concerns. The patient will continue PT consistent with posterior total hip replacement protocol. The patient will continue to practice posterior total hip precautions for a minimum of 6 week. The patient will continue LOVENOX for 4 weeks for blood clot prevention. The patient will take OXYCODONE AND TYLENOL for pain control and titrate according to prescription and patient needs. The patient will take Senna-S while taking oxycodone to prevent narcotic associated constipation.  Additionally, increase water intake (drink at least 8 glasses of water daily) and try adding fiber to the diet by eating fruits, vegetables and foods that are rich in grains. If constipation is experienced, contact the medical/primary care provider to discuss further treatment options. The patient is FULL weight bearing.    continue Lovenox shots for 1 month to prevent blood clot development in legs    hold blood pressure meds since blood pressure has been low off meds and follow up with PCP to recheck pressures

## 2018-11-13 NOTE — DISCHARGE NOTE ADULT - MEDICATION SUMMARY - MEDICATIONS TO TAKE
I will START or STAY ON the medications listed below when I get home from the hospital:    Oxaydo 5 mg oral tablet  -- 1 tab(s) by mouth every 6 hours, As Needed for pain MDD:4  -- Indication: For Pain    enoxaparin 40 mg/0.4 mL injectable solution  -- 40 milligram(s) subcutaneously once a day   -- It is very important that you take or use this exactly as directed.  Do not skip doses or discontinue unless directed by your doctor.    -- Indication: For dvt prophylaxisis

## 2018-11-13 NOTE — DISCHARGE NOTE ADULT - CARE PROVIDER_API CALL
Salvatore Manzano (MD), Orthopaedic Surgery  [address]  [place] [address]  Phone: [phone]  Fax: [phone]

## 2018-11-13 NOTE — PROGRESS NOTE ADULT - SUBJECTIVE AND OBJECTIVE BOX
SB SAEED    [id number]    63y      Male    CC: Left Hip fracture     INTERVAL HPI/OVERNIGHT EVENTS:  62 y/o male with history of controlled HTN. Patient was in his usual state of health until he was walking off his deck and his left foot fell through a rotted step causing him to fall and land on his left side. He had immediate pain and inability to walk due to pain on left side. he did not hit his head and had no other injuries. In the ED he was found to have left femoral neck fx.     pt today states pain is controlled. no sob no chest pain no abd pain    REVIEW OF SYSTEMS:    CONSTITUTIONAL: No fever, weight loss, or fatigue  RESPIRATORY: No cough, wheezing, hemoptysis; No shortness of breath  CARDIOVASCULAR: No chest pain, palpitations  GASTROINTESTINAL: No abdominal or epigastric pain. No nausea, vomiting        Vital Signs Last 24 Hrs  T(C): 37.1 (13 Nov 2018 09:05), Max: 37.1 (13 Nov 2018 09:05)  T(F): 98.8 (13 Nov 2018 09:05), Max: 98.8 (13 Nov 2018 09:05)  HR: 82 (13 Nov 2018 09:05) (82 - 97)  BP: 137/92 (13 Nov 2018 09:05) (124/70 - 137/92)  BP(mean): --  RR: 16 (13 Nov 2018 09:05) (16 - 20)  SpO2: 96% (13 Nov 2018 09:05) (95% - 99%)    PHYSICAL EXAM:    GENERAL: NAD, well-groomed  HEENT: PERRL, +EOMI  CHEST/LUNG: Clear to auscultation bilaterally; No wheezing  HEART: S1S2+, Regular rate and rhythm; No murmurs  ABDOMEN: Soft, Nontender, Nondistended; Bowel sounds present  EXTREMITIES:  2+ Peripheral Pulses, No clubbing, cyanosis, or edema      LABS:                        14.2   8.4   )-----------( 166      ( 13 Nov 2018 06:30 )             41.5     11-13    144  |  103  |  7.0<L>  ----------------------------<  120<H>  4.4   |  25.0  |  0.82    Ca    9.3      13 Nov 2018 06:30  Mg     2.2     11-13    TPro  6.9  /  Alb  4.4  /  TBili  0.2<L>  /  DBili  x   /  AST  21  /  ALT  22  /  AlkPhos  70  11-11    PT/INR - ( 11 Nov 2018 17:33 )   PT: 10.9 sec;   INR: 0.95 ratio         PTT - ( 11 Nov 2018 17:33 )  PTT:30.5 sec        MEDICATIONS  (STANDING):  acetaminophen  IVPB .. 1000 milliGRAM(s) IV Intermittent once  BUpivacaine liposome 1.3% Injectable 20 milliLiter(s) Local Injection once  ceFAZolin   IVPB 2000 milliGRAM(s) IV Intermittent once  ceFAZolin   IVPB 2000 milliGRAM(s) IV Intermittent once  hydrochlorothiazide 25 milliGRAM(s) Oral daily  lactated ringers. 1000 milliLiter(s) (80 mL/Hr) IV Continuous <Continuous>  losartan 100 milliGRAM(s) Oral daily  sodium chloride 0.9% lock flush 3 milliLiter(s) IV Push every 8 hours  tranexamic acid IVPB 930 milliGRAM(s) IV Intermittent once  tranexamic acid IVPB 930 milliGRAM(s) IV Intermittent once    MEDICATIONS  (PRN):  morphine  - Injectable 2 milliGRAM(s) IV Push every 4 hours PRN Moderate Pain (4 - 6)  ondansetron Injectable 4 milliGRAM(s) IV Push every 6 hours PRN Nausea

## 2018-11-13 NOTE — DISCHARGE NOTE ADULT - PATIENT PORTAL LINK FT
You can access the [facility] Patient Portal, offered by [facility], by registering with the following website: [email or web address]

## 2018-11-13 NOTE — DISCHARGE NOTE ADULT - PLAN OF CARE
s/p srugical repair follow up with ortho in 1-2 weeks  pain meds as needed  continue Lovenox shots for 1 month to prevent blood clot development in legs stable hold blood pressure meds since blood pressure has been low off meds and follow up with PCP to recheck pressures

## 2018-11-14 DIAGNOSIS — D50.0 IRON DEFICIENCY ANEMIA SECONDARY TO BLOOD LOSS (CHRONIC): ICD-10-CM

## 2018-11-14 LAB
ANION GAP SERPL CALC-SCNC: 10 MMOL/L — SIGNIFICANT CHANGE UP (ref 5–17)
BUN SERPL-MCNC: 10 MG/DL — SIGNIFICANT CHANGE UP (ref 8–20)
CALCIUM SERPL-MCNC: 8.2 MG/DL — LOW (ref 8.6–10.2)
CHLORIDE SERPL-SCNC: 94 MMOL/L — LOW (ref 98–107)
CO2 SERPL-SCNC: 26 MMOL/L — SIGNIFICANT CHANGE UP (ref 22–29)
CREAT SERPL-MCNC: 0.79 MG/DL — SIGNIFICANT CHANGE UP (ref 0.5–1.3)
GLUCOSE SERPL-MCNC: 138 MG/DL — HIGH (ref 70–115)
HCT VFR BLD CALC: 33.1 % — LOW (ref 42–52)
HGB BLD-MCNC: 11.3 G/DL — LOW (ref 14–18)
MAGNESIUM SERPL-MCNC: 1.7 MG/DL — SIGNIFICANT CHANGE UP (ref 1.6–2.6)
MCHC RBC-ENTMCNC: 32.7 PG — HIGH (ref 27–31)
MCHC RBC-ENTMCNC: 34.1 G/DL — SIGNIFICANT CHANGE UP (ref 32–36)
MCV RBC AUTO: 95.7 FL — HIGH (ref 80–94)
PLATELET # BLD AUTO: 154 K/UL — SIGNIFICANT CHANGE UP (ref 150–400)
POTASSIUM SERPL-MCNC: 3.8 MMOL/L — SIGNIFICANT CHANGE UP (ref 3.5–5.3)
POTASSIUM SERPL-SCNC: 3.8 MMOL/L — SIGNIFICANT CHANGE UP (ref 3.5–5.3)
RBC # BLD: 3.46 M/UL — LOW (ref 4.6–6.2)
RBC # FLD: 12.3 % — SIGNIFICANT CHANGE UP (ref 11–15.6)
SODIUM SERPL-SCNC: 130 MMOL/L — LOW (ref 135–145)
WBC # BLD: 14.5 K/UL — HIGH (ref 4.8–10.8)
WBC # FLD AUTO: 14.5 K/UL — HIGH (ref 4.8–10.8)

## 2018-11-14 PROCEDURE — 99232 SBSQ HOSP IP/OBS MODERATE 35: CPT

## 2018-11-14 RX ORDER — SODIUM CHLORIDE 9 MG/ML
500 INJECTION INTRAMUSCULAR; INTRAVENOUS; SUBCUTANEOUS ONCE
Qty: 0 | Refills: 0 | Status: COMPLETED | OUTPATIENT
Start: 2018-11-14 | End: 2018-11-14

## 2018-11-14 RX ADMIN — Medication 100 MILLIGRAM(S): at 22:17

## 2018-11-14 RX ADMIN — Medication 975 MILLIGRAM(S): at 14:18

## 2018-11-14 RX ADMIN — SODIUM CHLORIDE 1000 MILLILITER(S): 9 INJECTION INTRAMUSCULAR; INTRAVENOUS; SUBCUTANEOUS at 17:44

## 2018-11-14 RX ADMIN — Medication 15 MILLIGRAM(S): at 22:18

## 2018-11-14 RX ADMIN — Medication 975 MILLIGRAM(S): at 14:24

## 2018-11-14 RX ADMIN — OXYCODONE HYDROCHLORIDE 20 MILLIGRAM(S): 5 TABLET ORAL at 17:48

## 2018-11-14 RX ADMIN — Medication 15 MILLIGRAM(S): at 07:39

## 2018-11-14 RX ADMIN — Medication 100 MILLIGRAM(S): at 06:39

## 2018-11-14 RX ADMIN — Medication 15 MILLIGRAM(S): at 06:39

## 2018-11-14 RX ADMIN — Medication 975 MILLIGRAM(S): at 06:39

## 2018-11-14 RX ADMIN — Medication 100 MILLIGRAM(S): at 14:22

## 2018-11-14 RX ADMIN — Medication 15 MILLIGRAM(S): at 14:22

## 2018-11-14 RX ADMIN — POLYETHYLENE GLYCOL 3350 17 GRAM(S): 17 POWDER, FOR SOLUTION ORAL at 17:42

## 2018-11-14 RX ADMIN — SODIUM CHLORIDE 100 MILLILITER(S): 9 INJECTION INTRAMUSCULAR; INTRAVENOUS; SUBCUTANEOUS at 17:44

## 2018-11-14 RX ADMIN — ENOXAPARIN SODIUM 40 MILLIGRAM(S): 100 INJECTION SUBCUTANEOUS at 11:51

## 2018-11-14 RX ADMIN — ONDANSETRON 4 MILLIGRAM(S): 8 TABLET, FILM COATED ORAL at 08:29

## 2018-11-14 RX ADMIN — Medication 975 MILLIGRAM(S): at 22:17

## 2018-11-14 RX ADMIN — OXYCODONE HYDROCHLORIDE 20 MILLIGRAM(S): 5 TABLET ORAL at 07:39

## 2018-11-14 RX ADMIN — Medication 975 MILLIGRAM(S): at 23:16

## 2018-11-14 RX ADMIN — Medication 15 MILLIGRAM(S): at 14:25

## 2018-11-14 RX ADMIN — Medication 975 MILLIGRAM(S): at 07:39

## 2018-11-14 RX ADMIN — Medication 100 MILLIGRAM(S): at 04:29

## 2018-11-14 RX ADMIN — OXYCODONE HYDROCHLORIDE 20 MILLIGRAM(S): 5 TABLET ORAL at 06:38

## 2018-11-14 RX ADMIN — Medication 1 TABLET(S): at 11:52

## 2018-11-14 RX ADMIN — Medication 15 MILLIGRAM(S): at 23:16

## 2018-11-14 NOTE — PROGRESS NOTE ADULT - SUBJECTIVE AND OBJECTIVE BOX
Ortho Progress note    Name: SB SAEED    MR #: [id number]    Procedure: Left MAHNAZ  Surgeon: Dr. Manzano    Pt comfortable without complaints, pain controlled  Pt has been participating in PT  Denies CP, SOB, N/V, numbness/tingling     General Exam:  Vital Signs Last 24 Hrs  T(C): 37.2 (11-14-18 @ 04:10), Max: 37.2 (11-14-18 @ 04:10)  T(F): 99 (11-14-18 @ 04:10), Max: 99 (11-14-18 @ 04:10)  HR: 105 (11-14-18 @ 04:10) (105 - 105)  BP: 109/67 (11-14-18 @ 04:10) (109/67 - 109/67)  BP(mean): --  RR: 18 (11-14-18 @ 04:10) (18 - 18)  SpO2: 96% (11-14-18 @ 04:10) (96% - 96%)    General: Pt Alert and oriented, NAD, controlled pain.  Dressings C/D/I. No bleeding.  Pulses: 2+ dorsalis pedis pulse. Cap refill < 2 sec.  Sensation: Grossly intact to light touch without deficit.  Motor: + EHL/FHL/TA/GS    AM labs pending    A/P: 63yMale with left femoral neck fracture s/p Left total hip arthroplasty  POD#1     - Stable  - Pain Control  - DVT ppx: Lovenox 40 QD x 4 weeks, SCD's  - Continue PT, posterior hip precautions   - Weight bearing status: WBAT  - Care per primary team  - DC planning

## 2018-11-14 NOTE — OCCUPATIONAL THERAPY INITIAL EVALUATION ADULT - ADDITIONAL COMMENTS
pt states lives in a house with 2 MOY and one handrail and inside 12 steps to bedroom with OSCAR. hand rails and lives with wife and son. Wife works as teachers aid and will take time off work if need. 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 Pt has no DME and was independent prior and fell when he stepped on rotted wood deck in his backyard and fell through wood

## 2018-11-14 NOTE — PROGRESS NOTE ADULT - SUBJECTIVE AND OBJECTIVE BOX
SB SAEED    [id number]    63y      Male    CC: Left Hip fracture     INTERVAL HPI/OVERNIGHT EVENTS:  64 y/o male with history of controlled HTN. Patient was in his usual state of health until he was walking off his deck and his left foot fell through a rotted step causing him to fall and land on his left side. He had immediate pain and inability to walk due to pain on left side. he did not hit his head and had no other injuries. In the ED he was found to have left femoral neck fx.     pt s.p ORIF 11/13/18    pt today states pain is controlled. no sob no chest pain no abd pain    REVIEW OF SYSTEMS:    CONSTITUTIONAL: No fever, weight loss, or fatigue  RESPIRATORY: No cough, wheezing, hemoptysis; No shortness of breath  CARDIOVASCULAR: No chest pain, palpitations  GASTROINTESTINAL: No abdominal or epigastric pain. No nausea, vomiting        Vital Signs Last 24 Hrs  T(C): 36.8 (14 Nov 2018 08:20), Max: 37.2 (13 Nov 2018 14:05)  T(F): 98.3 (14 Nov 2018 08:20), Max: 99 (13 Nov 2018 14:05)  HR: 79 (14 Nov 2018 11:56) (68 - 105)  BP: 105/66 (14 Nov 2018 11:56) (96/62 - 146/75)  BP(mean): 98 (13 Nov 2018 14:50) (81 - 98)  RR: 17 (14 Nov 2018 11:56) (14 - 19)  SpO2: 99% (14 Nov 2018 11:56) (95% - 100%)    PHYSICAL EXAM:    GENERAL: NAD, well-groomed  HEENT: PERRL, +EOMI  CHEST/LUNG: Clear to auscultation bilaterally; No wheezing  HEART: S1S2+, Regular rate and rhythm; No murmurs  ABDOMEN: Soft, Nontender, Nondistended; Bowel sounds present  EXTREMITIES:  2+ Peripheral Pulses, No clubbing, cyanosis, or edema      LABS:                        11.3   14.5  )-----------( 154      ( 14 Nov 2018 07:35 )             33.1     11-14    130<L>  |  94<L>  |  10.0  ----------------------------<  138<H>  3.8   |  26.0  |  0.79    Ca    8.2<L>      14 Nov 2018 07:35  Mg     1.7     11-14        MEDICATIONS  (STANDING):  acetaminophen   Tablet .. 975 milliGRAM(s) Oral every 8 hours  ceFAZolin   IVPB 2000 milliGRAM(s) IV Intermittent once  docusate sodium 100 milliGRAM(s) Oral three times a day  enoxaparin Injectable 40 milliGRAM(s) SubCutaneous daily  ketorolac   Injectable 15 milliGRAM(s) IV Push every 8 hours  multivitamin 1 Tablet(s) Oral daily  oxyCODONE  ER Tablet 20 milliGRAM(s) Oral every 12 hours  polyethylene glycol 3350 17 Gram(s) Oral daily  sodium chloride 0.9% Bolus 500 milliLiter(s) IV Bolus once  sodium chloride 0.9%. 1000 milliLiter(s) (100 mL/Hr) IV Continuous <Continuous>    MEDICATIONS  (PRN):  aluminum hydroxide/magnesium hydroxide/simethicone Suspension 30 milliLiter(s) Oral four times a day PRN Indigestion  diphenhydrAMINE 25 milliGRAM(s) Oral at bedtime PRN Insomnia  HYDROmorphone  Injectable 0.5 milliGRAM(s) IV Push every 3 hours PRN breakthrough Pain  magnesium hydroxide Suspension 30 milliLiter(s) Oral two times a day PRN Constipation  ondansetron Injectable 4 milliGRAM(s) IV Push every 6 hours PRN Nausea and/or Vomiting  oxyCODONE    IR 5 milliGRAM(s) Oral every 3 hours PRN Mild Pain (1 - 3)  oxyCODONE    IR 10 milliGRAM(s) Oral every 3 hours PRN Moderate Pain (4 - 6)  senna 2 Tablet(s) Oral at bedtime PRN Constipation

## 2018-11-15 LAB
ANION GAP SERPL CALC-SCNC: 9 MMOL/L — SIGNIFICANT CHANGE UP (ref 5–17)
BUN SERPL-MCNC: 12 MG/DL — SIGNIFICANT CHANGE UP (ref 8–20)
CALCIUM SERPL-MCNC: 8.3 MG/DL — LOW (ref 8.6–10.2)
CHLORIDE SERPL-SCNC: 96 MMOL/L — LOW (ref 98–107)
CO2 SERPL-SCNC: 26 MMOL/L — SIGNIFICANT CHANGE UP (ref 22–29)
CREAT SERPL-MCNC: 0.8 MG/DL — SIGNIFICANT CHANGE UP (ref 0.5–1.3)
GLUCOSE SERPL-MCNC: 118 MG/DL — HIGH (ref 70–115)
HCT VFR BLD CALC: 27.3 % — LOW (ref 42–52)
HGB BLD-MCNC: 9.5 G/DL — LOW (ref 14–18)
MAGNESIUM SERPL-MCNC: 1.8 MG/DL — SIGNIFICANT CHANGE UP (ref 1.8–2.6)
MCHC RBC-ENTMCNC: 33 PG — HIGH (ref 27–31)
MCHC RBC-ENTMCNC: 34.8 G/DL — SIGNIFICANT CHANGE UP (ref 32–36)
MCV RBC AUTO: 94.8 FL — HIGH (ref 80–94)
PLATELET # BLD AUTO: 134 K/UL — LOW (ref 150–400)
POTASSIUM SERPL-MCNC: 4 MMOL/L — SIGNIFICANT CHANGE UP (ref 3.5–5.3)
POTASSIUM SERPL-SCNC: 4 MMOL/L — SIGNIFICANT CHANGE UP (ref 3.5–5.3)
RBC # BLD: 2.88 M/UL — LOW (ref 4.6–6.2)
RBC # FLD: 11.7 % — SIGNIFICANT CHANGE UP (ref 11–15.6)
SODIUM SERPL-SCNC: 131 MMOL/L — LOW (ref 135–145)
WBC # BLD: 9.2 K/UL — SIGNIFICANT CHANGE UP (ref 4.8–10.8)
WBC # FLD AUTO: 9.2 K/UL — SIGNIFICANT CHANGE UP (ref 4.8–10.8)

## 2018-11-15 PROCEDURE — 74176 CT ABD & PELVIS W/O CONTRAST: CPT | Mod: 26

## 2018-11-15 PROCEDURE — 99232 SBSQ HOSP IP/OBS MODERATE 35: CPT

## 2018-11-15 RX ORDER — MAGNESIUM HYDROXIDE 400 MG/1
30 TABLET, CHEWABLE ORAL ONCE
Qty: 0 | Refills: 0 | Status: COMPLETED | OUTPATIENT
Start: 2018-11-15 | End: 2018-11-15

## 2018-11-15 RX ADMIN — Medication 975 MILLIGRAM(S): at 07:34

## 2018-11-15 RX ADMIN — MAGNESIUM HYDROXIDE 30 MILLILITER(S): 400 TABLET, CHEWABLE ORAL at 16:27

## 2018-11-15 RX ADMIN — Medication 100 MILLIGRAM(S): at 22:34

## 2018-11-15 RX ADMIN — OXYCODONE HYDROCHLORIDE 20 MILLIGRAM(S): 5 TABLET ORAL at 06:28

## 2018-11-15 RX ADMIN — Medication 975 MILLIGRAM(S): at 06:28

## 2018-11-15 RX ADMIN — Medication 15 MILLIGRAM(S): at 22:50

## 2018-11-15 RX ADMIN — Medication 975 MILLIGRAM(S): at 14:49

## 2018-11-15 RX ADMIN — ENOXAPARIN SODIUM 40 MILLIGRAM(S): 100 INJECTION SUBCUTANEOUS at 11:12

## 2018-11-15 RX ADMIN — Medication 15 MILLIGRAM(S): at 22:34

## 2018-11-15 RX ADMIN — POLYETHYLENE GLYCOL 3350 17 GRAM(S): 17 POWDER, FOR SOLUTION ORAL at 11:14

## 2018-11-15 RX ADMIN — Medication 1 TABLET(S): at 11:12

## 2018-11-15 RX ADMIN — Medication 975 MILLIGRAM(S): at 14:50

## 2018-11-15 RX ADMIN — Medication 15 MILLIGRAM(S): at 07:34

## 2018-11-15 RX ADMIN — Medication 15 MILLIGRAM(S): at 14:50

## 2018-11-15 RX ADMIN — Medication 15 MILLIGRAM(S): at 14:46

## 2018-11-15 RX ADMIN — Medication 975 MILLIGRAM(S): at 23:34

## 2018-11-15 RX ADMIN — OXYCODONE HYDROCHLORIDE 20 MILLIGRAM(S): 5 TABLET ORAL at 07:34

## 2018-11-15 RX ADMIN — Medication 100 MILLIGRAM(S): at 06:29

## 2018-11-15 RX ADMIN — Medication 100 MILLIGRAM(S): at 11:12

## 2018-11-15 RX ADMIN — Medication 975 MILLIGRAM(S): at 22:34

## 2018-11-15 RX ADMIN — Medication 15 MILLIGRAM(S): at 06:28

## 2018-11-15 NOTE — PROGRESS NOTE ADULT - SUBJECTIVE AND OBJECTIVE BOX
SB SAEED    [id number]    63y      Male    CC: Left Hip fracture     INTERVAL HPI/OVERNIGHT EVENTS:  64 y/o male with history of controlled HTN. Patient was in his usual state of health until he was walking off his deck and his left foot fell through a rotted step causing him to fall and land on his left side. He had immediate pain and inability to walk due to pain on left side. he did not hit his head and had no other injuries. In the ED he was found to have left femoral neck fx.     pt s.p ORIF 11/13/18    pt today states pain is controlled. no sob no chest pain no abd pain    REVIEW OF SYSTEMS:    CONSTITUTIONAL: No fever, weight loss, or fatigue  RESPIRATORY: No cough, wheezing, hemoptysis; No shortness of breath  CARDIOVASCULAR: No chest pain, palpitations  GASTROINTESTINAL: No abdominal or epigastric pain. No nausea, vomiting      Vital Signs Last 24 Hrs  T(C): 36.7 (15 Nov 2018 08:23), Max: 36.8 (15 Nov 2018 04:15)  T(F): 98 (15 Nov 2018 08:23), Max: 98.2 (15 Nov 2018 04:15)  HR: 81 (15 Nov 2018 08:23) (79 - 98)  BP: 111/64 (15 Nov 2018 08:23) (99/59 - 121/79)  BP(mean): --  RR: 16 (15 Nov 2018 08:23) (16 - 18)  SpO2: 97% (15 Nov 2018 08:23) (95% - 99%)    PHYSICAL EXAM:  GENERAL: NAD, well-groomed  HEENT: PERRL, +EOMI  CHEST/LUNG: Clear to auscultation bilaterally; No wheezing  HEART: S1S2+, Regular rate and rhythm; No murmurs  ABDOMEN: Soft, Nontender, Nondistended; Bowel sounds present  EXTREMITIES:  2+ Peripheral Pulses, No clubbing, cyanosis, or edema, no hematoma at site of incision      LABS:                        9.5    9.2   )-----------( 134      ( 15 Nov 2018 05:27 )             27.3     11-15    131<L>  |  96<L>  |  12.0  ----------------------------<  118<H>  4.0   |  26.0  |  0.80    Ca    8.3<L>      15 Nov 2018 05:27  Mg     1.8     11-15        MEDICATIONS  (STANDING):  acetaminophen   Tablet .. 975 milliGRAM(s) Oral every 8 hours  docusate sodium 100 milliGRAM(s) Oral three times a day  enoxaparin Injectable 40 milliGRAM(s) SubCutaneous daily  ketorolac   Injectable 15 milliGRAM(s) IV Push every 8 hours  multivitamin 1 Tablet(s) Oral daily  polyethylene glycol 3350 17 Gram(s) Oral daily    MEDICATIONS  (PRN):  aluminum hydroxide/magnesium hydroxide/simethicone Suspension 30 milliLiter(s) Oral four times a day PRN Indigestion  bisacodyl Suppository 10 milliGRAM(s) Rectal daily PRN If no bowel movement by POD#2  diphenhydrAMINE 25 milliGRAM(s) Oral at bedtime PRN Insomnia  HYDROmorphone  Injectable 0.5 milliGRAM(s) IV Push every 3 hours PRN breakthrough Pain  magnesium hydroxide Suspension 30 milliLiter(s) Oral two times a day PRN Constipation  ondansetron Injectable 4 milliGRAM(s) IV Push every 6 hours PRN Nausea and/or Vomiting  oxyCODONE    IR 5 milliGRAM(s) Oral every 3 hours PRN Mild Pain (1 - 3)  oxyCODONE    IR 10 milliGRAM(s) Oral every 3 hours PRN Moderate Pain (4 - 6)  senna 2 Tablet(s) Oral at bedtime PRN Constipation

## 2018-11-15 NOTE — PROGRESS NOTE ADULT - PROBLEM SELECTOR PLAN 2
expected post operation, however pt h/h dropped again now down to 9.2, Admit H/h was 14  - will obtain ct abd/pelvis to rule out bleed  - repeat cbc in am  - if ct negative and h/h in am stable will dc home 11/16/18

## 2018-11-15 NOTE — PROGRESS NOTE ADULT - SUBJECTIVE AND OBJECTIVE BOX
SB SAEED    [id number]    History: Patient is status post left posterior total hip arthroplasty for left femoral neck fracture on 11/13/18, POD # 2. Patient is doing well. The patient's pain is controlled using the prescribed pain medications. The patient is participating in physical therapy. Denies nausea, vomiting, chest pain, shortness of breath, abdominal pain or fever. No new complaints.                          9.5    9.2   )-----------( 134      ( 15 Nov 2018 05:27 )             27.3     11-15    131<L>  |  96<L>  |  12.0  ----------------------------<  118<H>  4.0   |  26.0  |  0.80    Ca    8.3<L>      15 Nov 2018 05:27  Mg     1.8     11-15    MEDICATIONS  (STANDING):  acetaminophen   Tablet .. 975 milliGRAM(s) Oral every 8 hours  docusate sodium 100 milliGRAM(s) Oral three times a day  enoxaparin Injectable 40 milliGRAM(s) SubCutaneous daily  ketorolac   Injectable 15 milliGRAM(s) IV Push every 8 hours  multivitamin 1 Tablet(s) Oral daily  polyethylene glycol 3350 17 Gram(s) Oral daily  sodium chloride 0.9%. 1000 milliLiter(s) (100 mL/Hr) IV Continuous <Continuous>    MEDICATIONS  (PRN):  aluminum hydroxide/magnesium hydroxide/simethicone Suspension 30 milliLiter(s) Oral four times a day PRN Indigestion  bisacodyl Suppository 10 milliGRAM(s) Rectal daily PRN If no bowel movement by POD#2  diphenhydrAMINE 25 milliGRAM(s) Oral at bedtime PRN Insomnia  HYDROmorphone  Injectable 0.5 milliGRAM(s) IV Push every 3 hours PRN breakthrough Pain  magnesium hydroxide Suspension 30 milliLiter(s) Oral two times a day PRN Constipation  ondansetron Injectable 4 milliGRAM(s) IV Push every 6 hours PRN Nausea and/or Vomiting  oxyCODONE    IR 5 milliGRAM(s) Oral every 3 hours PRN Mild Pain (1 - 3)  oxyCODONE    IR 10 milliGRAM(s) Oral every 3 hours PRN Moderate Pain (4 - 6)  senna 2 Tablet(s) Oral at bedtime PRN Constipation    Physical exam: The left hip dressing is clean, dry and intact. Dressing removed. Prineo in place. Incision healing well. No drainage or discharge. No erythema is noted. No blistering. No ecchymosis. New dressing applied. The calf is supple nontender. Passive range of motion is acceptable to due postoperative pain. Sensation to light touch is grossly intact distally. Motor function distally is 5/5. No foot drop. 2+ dorsalis pedis pulse. Capillary refill is less than 2 seconds. No cyanosis.    Primary Orthopedic Assessment:  • s/p LEFT POSTERIOR total hip replacement    Plan:   • DVT prophylaxis as prescribed, including use of compression devices and ankle pumps  • Continue physical therapy  • Weightbearing as tolerated of the left lower extremity with assistance of a walker  • Incentive spirometry encouraged  • Pain control as clinically indicated  • Posterior hip precautions  • Discharge planning – anticipated discharge is Home today when cleared by Medicine and PT  • Continue care per primary team

## 2018-11-16 VITALS
TEMPERATURE: 98 F | OXYGEN SATURATION: 97 % | HEART RATE: 87 BPM | SYSTOLIC BLOOD PRESSURE: 118 MMHG | DIASTOLIC BLOOD PRESSURE: 75 MMHG | RESPIRATION RATE: 16 BRPM

## 2018-11-16 LAB
ANION GAP SERPL CALC-SCNC: 8 MMOL/L — SIGNIFICANT CHANGE UP (ref 5–17)
BUN SERPL-MCNC: 13 MG/DL — SIGNIFICANT CHANGE UP (ref 8–20)
CALCIUM SERPL-MCNC: 8.8 MG/DL — SIGNIFICANT CHANGE UP (ref 8.6–10.2)
CHLORIDE SERPL-SCNC: 101 MMOL/L — SIGNIFICANT CHANGE UP (ref 98–107)
CO2 SERPL-SCNC: 26 MMOL/L — SIGNIFICANT CHANGE UP (ref 22–29)
CREAT SERPL-MCNC: 0.84 MG/DL — SIGNIFICANT CHANGE UP (ref 0.5–1.3)
GLUCOSE SERPL-MCNC: 122 MG/DL — HIGH (ref 70–115)
HCT VFR BLD CALC: 29.2 % — LOW (ref 42–52)
HGB BLD-MCNC: 9.7 G/DL — LOW (ref 14–18)
MAGNESIUM SERPL-MCNC: 2.4 MG/DL — SIGNIFICANT CHANGE UP (ref 1.6–2.6)
MCHC RBC-ENTMCNC: 31.8 PG — HIGH (ref 27–31)
MCHC RBC-ENTMCNC: 33.2 G/DL — SIGNIFICANT CHANGE UP (ref 32–36)
MCV RBC AUTO: 95.7 FL — HIGH (ref 80–94)
PLATELET # BLD AUTO: 172 K/UL — SIGNIFICANT CHANGE UP (ref 150–400)
POTASSIUM SERPL-MCNC: 5 MMOL/L — SIGNIFICANT CHANGE UP (ref 3.5–5.3)
POTASSIUM SERPL-SCNC: 5 MMOL/L — SIGNIFICANT CHANGE UP (ref 3.5–5.3)
RBC # BLD: 3.05 M/UL — LOW (ref 4.6–6.2)
RBC # FLD: 12.4 % — SIGNIFICANT CHANGE UP (ref 11–15.6)
SODIUM SERPL-SCNC: 135 MMOL/L — SIGNIFICANT CHANGE UP (ref 135–145)
WBC # BLD: 7.6 K/UL — SIGNIFICANT CHANGE UP (ref 4.8–10.8)
WBC # FLD AUTO: 7.6 K/UL — SIGNIFICANT CHANGE UP (ref 4.8–10.8)

## 2018-11-16 PROCEDURE — C1776: CPT

## 2018-11-16 PROCEDURE — 85730 THROMBOPLASTIN TIME PARTIAL: CPT

## 2018-11-16 PROCEDURE — 97530 THERAPEUTIC ACTIVITIES: CPT

## 2018-11-16 PROCEDURE — 72192 CT PELVIS W/O DYE: CPT

## 2018-11-16 PROCEDURE — 80048 BASIC METABOLIC PNL TOTAL CA: CPT

## 2018-11-16 PROCEDURE — 86901 BLOOD TYPING SEROLOGIC RH(D): CPT

## 2018-11-16 PROCEDURE — 36415 COLL VENOUS BLD VENIPUNCTURE: CPT

## 2018-11-16 PROCEDURE — 73501 X-RAY EXAM HIP UNI 1 VIEW: CPT

## 2018-11-16 PROCEDURE — 97163 PT EVAL HIGH COMPLEX 45 MIN: CPT

## 2018-11-16 PROCEDURE — 82962 GLUCOSE BLOOD TEST: CPT

## 2018-11-16 PROCEDURE — 85610 PROTHROMBIN TIME: CPT

## 2018-11-16 PROCEDURE — 99239 HOSP IP/OBS DSCHRG MGMT >30: CPT

## 2018-11-16 PROCEDURE — 83735 ASSAY OF MAGNESIUM: CPT

## 2018-11-16 PROCEDURE — 85027 COMPLETE CBC AUTOMATED: CPT

## 2018-11-16 PROCEDURE — 71045 X-RAY EXAM CHEST 1 VIEW: CPT

## 2018-11-16 PROCEDURE — 93005 ELECTROCARDIOGRAM TRACING: CPT

## 2018-11-16 PROCEDURE — 88305 TISSUE EXAM BY PATHOLOGIST: CPT

## 2018-11-16 PROCEDURE — 97167 OT EVAL HIGH COMPLEX 60 MIN: CPT

## 2018-11-16 PROCEDURE — 86900 BLOOD TYPING SEROLOGIC ABO: CPT

## 2018-11-16 PROCEDURE — 97116 GAIT TRAINING THERAPY: CPT

## 2018-11-16 PROCEDURE — 80053 COMPREHEN METABOLIC PANEL: CPT

## 2018-11-16 PROCEDURE — 96374 THER/PROPH/DIAG INJ IV PUSH: CPT

## 2018-11-16 PROCEDURE — 74176 CT ABD & PELVIS W/O CONTRAST: CPT

## 2018-11-16 PROCEDURE — 86850 RBC ANTIBODY SCREEN: CPT

## 2018-11-16 PROCEDURE — 99285 EMERGENCY DEPT VISIT HI MDM: CPT | Mod: 25

## 2018-11-16 PROCEDURE — 97535 SELF CARE MNGMENT TRAINING: CPT

## 2018-11-16 PROCEDURE — 88311 DECALCIFY TISSUE: CPT

## 2018-11-16 PROCEDURE — C1713: CPT

## 2018-11-16 PROCEDURE — 97110 THERAPEUTIC EXERCISES: CPT

## 2018-11-16 PROCEDURE — 73502 X-RAY EXAM HIP UNI 2-3 VIEWS: CPT

## 2018-11-16 PROCEDURE — 76377 3D RENDER W/INTRP POSTPROCES: CPT

## 2018-11-16 RX ORDER — ENOXAPARIN SODIUM 100 MG/ML
40 INJECTION SUBCUTANEOUS
Qty: 30 | Refills: 0 | OUTPATIENT
Start: 2018-11-16 | End: 2018-12-15

## 2018-11-16 RX ORDER — LOSARTAN/HYDROCHLOROTHIAZIDE 100MG-25MG
1 TABLET ORAL
Qty: 0 | Refills: 0 | COMMUNITY

## 2018-11-16 RX ORDER — OXYCODONE HYDROCHLORIDE 5 MG/1
1 TABLET ORAL
Qty: 20 | Refills: 0 | OUTPATIENT
Start: 2018-11-16

## 2018-11-16 RX ADMIN — Medication 15 MILLIGRAM(S): at 06:48

## 2018-11-16 RX ADMIN — Medication 975 MILLIGRAM(S): at 07:31

## 2018-11-16 RX ADMIN — Medication 100 MILLIGRAM(S): at 06:33

## 2018-11-16 RX ADMIN — Medication 15 MILLIGRAM(S): at 06:33

## 2018-11-16 RX ADMIN — ENOXAPARIN SODIUM 40 MILLIGRAM(S): 100 INJECTION SUBCUTANEOUS at 12:19

## 2018-11-16 RX ADMIN — Medication 975 MILLIGRAM(S): at 06:33

## 2018-11-16 NOTE — PROGRESS NOTE ADULT - SUBJECTIVE AND OBJECTIVE BOX
Patient seen and examined at bedside. comfortable in bed, pain controlled. Denies fever/chills, SOB/chest pain, abdominal pain, numbness/tingling. no complaints.    Vital Signs Last 24 Hrs  T(C): 36.2 (16 Nov 2018 04:15), Max: 37.1 (15 Nov 2018 22:39)  T(F): 97.1 (16 Nov 2018 04:15), Max: 98.7 (15 Nov 2018 22:39)  HR: 88 (16 Nov 2018 04:15) (81 - 96)  BP: 140/83 (16 Nov 2018 04:15) (95/52 - 140/83)  BP(mean): --  RR: 20 (16 Nov 2018 04:15) (16 - 20)  SpO2: 99% (16 Nov 2018 04:15) (97% - 99%)    LLE: Dressing C/D/I. Abduction pillow noted, in place. SILT. + dorsi/plantarflexion. DP 2+. Calf soft NT B/L.                          9.5    9.2   )-----------( 134      ( 15 Nov 2018 05:27 )             27.3     11-15    131<L>  |  96<L>  |  12.0  ----------------------------<  118<H>  4.0   |  26.0  |  0.80    Ca    8.3<L>      15 Nov 2018 05:27  Mg     1.8     11-15      A/P: 63 y.o M s/p left MAHNAZ POD #3  - WBAT, posterior hip precautions  - posterior precautions reviewed with patient  - DVTP  - ortho stable for d/c Patient seen and examined at bedside. comfortable in bed, pain controlled. Denies fever/chills, SOB/chest pain, abdominal pain, numbness/tingling. no complaints.    Vital Signs Last 24 Hrs  T(C): 36.2 (16 Nov 2018 04:15), Max: 37.1 (15 Nov 2018 22:39)  T(F): 97.1 (16 Nov 2018 04:15), Max: 98.7 (15 Nov 2018 22:39)  HR: 88 (16 Nov 2018 04:15) (81 - 96)  BP: 140/83 (16 Nov 2018 04:15) (95/52 - 140/83)  BP(mean): --  RR: 20 (16 Nov 2018 04:15) (16 - 20)  SpO2: 99% (16 Nov 2018 04:15) (97% - 99%)    LLE: Dressing C/D/I. Abduction pillow noted, in place. SILT. + dorsi/plantarflexion. DP 2+. Calf soft NT B/L.                          9.5    9.2   )-----------( 134      ( 15 Nov 2018 05:27 )             27.3     11-15    131<L>  |  96<L>  |  12.0  ----------------------------<  118<H>  4.0   |  26.0  |  0.80    Ca    8.3<L>      15 Nov 2018 05:27  Mg     1.8     11-15      A/P: 63 y.o M s/p left MAHNAZ POD #3  - WBAT, posterior hip precautions  - posterior precautions reviewed with patient  - DVTP  - ortho stable for d/c, if stable AM labs

## 2018-11-16 NOTE — PROGRESS NOTE ADULT - REASON FOR ADMISSION
left hip pain

## 2018-11-16 NOTE — PROGRESS NOTE ADULT - PROBLEM SELECTOR PLAN 1
- s/p ORIF 11/13/18  - ortho following and cleared for discharge follow u p as outpatient  - PT evaluated can go home with home PT

## 2018-11-16 NOTE — PROGRESS NOTE ADULT - SUBJECTIVE AND OBJECTIVE BOX
SB SAEED    [id number]    63y      Male    CC: Left Hip fracture     INTERVAL HPI/OVERNIGHT EVENTS:  62 y/o male with history of controlled HTN. Patient was in his usual state of health until he was walking off his deck and his left foot fell through a rotted step causing him to fall and land on his left side. He had immediate pain and inability to walk due to pain on left side. he did not hit his head and had no other injuries. In the ED he was found to have left femoral neck fx.     pt s.p ORIF 11/13/18    pt today states pain is controlled. no sob no chest pain no abd pain    REVIEW OF SYSTEMS:    CONSTITUTIONAL: No fever, weight loss, or fatigue  RESPIRATORY: No cough, wheezing, hemoptysis; No shortness of breath  CARDIOVASCULAR: No chest pain, palpitations  GASTROINTESTINAL: No abdominal or epigastric pain. No nausea, vomiting        Vital Signs Last 24 Hrs  T(C): 36.8 (16 Nov 2018 08:17), Max: 37.1 (15 Nov 2018 22:39)  T(F): 98.3 (16 Nov 2018 08:17), Max: 98.7 (15 Nov 2018 22:39)  HR: 87 (16 Nov 2018 08:17) (84 - 96)  BP: 118/75 (16 Nov 2018 08:17) (95/52 - 140/83)  BP(mean): --  RR: 16 (16 Nov 2018 08:17) (16 - 20)  SpO2: 97% (16 Nov 2018 08:17) (97% - 99%)    PHYSICAL EXAM:    GENERAL: NAD, well-groomed  HEENT: PERRL, +EOMI  CHEST/LUNG: Clear to auscultation bilaterally; No wheezing  HEART: S1S2+, Regular rate and rhythm; No murmurs  ABDOMEN: Soft, Nontender, Nondistended; Bowel sounds present  EXTREMITIES:  2+ Peripheral Pulses, No clubbing, cyanosis, or edema, no hematoma at site of incision      LABS:                        9.7    7.6   )-----------( 172      ( 16 Nov 2018 08:53 )             29.2     11-16    135  |  101  |  13.0  ----------------------------<  122<H>  5.0   |  26.0  |  0.84    Ca    8.8      16 Nov 2018 08:53  Mg     2.4     11-16        MEDICATIONS  (STANDING):  acetaminophen   Tablet .. 975 milliGRAM(s) Oral every 8 hours  docusate sodium 100 milliGRAM(s) Oral three times a day  enoxaparin Injectable 40 milliGRAM(s) SubCutaneous daily  ketorolac   Injectable 15 milliGRAM(s) IV Push every 8 hours  multivitamin 1 Tablet(s) Oral daily  polyethylene glycol 3350 17 Gram(s) Oral daily    MEDICATIONS  (PRN):  aluminum hydroxide/magnesium hydroxide/simethicone Suspension 30 milliLiter(s) Oral four times a day PRN Indigestion  bisacodyl Suppository 10 milliGRAM(s) Rectal daily PRN If no bowel movement by POD#2  diphenhydrAMINE 25 milliGRAM(s) Oral at bedtime PRN Insomnia  HYDROmorphone  Injectable 0.5 milliGRAM(s) IV Push every 3 hours PRN breakthrough Pain  magnesium hydroxide Suspension 30 milliLiter(s) Oral two times a day PRN Constipation  ondansetron Injectable 4 milliGRAM(s) IV Push every 6 hours PRN Nausea and/or Vomiting  oxyCODONE    IR 5 milliGRAM(s) Oral every 3 hours PRN Mild Pain (1 - 3)  oxyCODONE    IR 10 milliGRAM(s) Oral every 3 hours PRN Moderate Pain (4 - 6)  senna 2 Tablet(s) Oral at bedtime PRN Constipation    CT abd/pelvis:   FINDINGS:   The patient is status post total LEFT hip replacement.  There is a a LEFT hip subfascial fluid collection surrounding the outer   aspect of the gluteus medialis, underlying of the tensor fascia katie   muscle and overlying the lateral aspect of vastus lateralis muscle   containing multiple punctate air lucencies.  Total fluid collection [id number] cm in length and 2.0 cm in transverse   diameter. Hounsfield units measure 22. Findings may indicate   infection/abscess versus a postoperative fluid collection . continued   surveillance recommended.    The LEFT hip musculature as visualized are unremarkable. The lung bases   are clear.     The visualized portions of the heart are normal.    There is no free intra-abdominal air or ascites.     The unopacified liver, spleen, pancreas, adrenal glands and gallbladder   are normal.     There is no intra or extrahepatic biliary ductal dilatation.    The stomach, duodenum, small, and large bowel and appendix are normal.    Both kidneys show normal morphology without urolithiasis or   hydronephrosis.     The urinary bladder shows normal morphology and contour.        Prostate and seminal vesicles within normal limits.    There are no retroperitoneal masses or abnormal lymphadenopathy.  The retroperitoneal vascular structures are normal.       IMPRESSION:     Postoperative of fluid collection surrounding either aspect of LEFT hip   musculature containing multiple punctate lucencies .  Abscess cannot be excluded. Further evaluation recommended.                JEFFRY REYES M.D., ATTENDING RADIOLOGIST  This document has been electronically signed. Nov 15 2018 11:49AM

## 2018-11-16 NOTE — PROGRESS NOTE ADULT - NSHPATTENDINGPLANDISCUSS_GEN_ALL_CORE
patient and answered his questions
patient stable for discharge home

## 2018-11-20 PROBLEM — I10 ESSENTIAL (PRIMARY) HYPERTENSION: Chronic | Status: ACTIVE | Noted: 2018-11-11

## 2018-11-20 PROBLEM — Z00.00 ENCOUNTER FOR PREVENTIVE HEALTH EXAMINATION: Status: ACTIVE | Noted: 2018-11-20

## 2018-11-21 ENCOUNTER — OTHER (OUTPATIENT)
Age: 63
End: 2018-11-21

## 2018-11-28 LAB — SURGICAL PATHOLOGY FINAL REPORT - CH: SIGNIFICANT CHANGE UP

## 2018-11-30 ENCOUNTER — APPOINTMENT (OUTPATIENT)
Dept: ORTHOPEDIC SURGERY | Facility: CLINIC | Age: 63
End: 2018-11-30
Payer: COMMERCIAL

## 2018-11-30 DIAGNOSIS — Z86.79 PERSONAL HISTORY OF OTHER DISEASES OF THE CIRCULATORY SYSTEM: ICD-10-CM

## 2018-11-30 PROCEDURE — 99024 POSTOP FOLLOW-UP VISIT: CPT

## 2018-11-30 PROCEDURE — 73502 X-RAY EXAM HIP UNI 2-3 VIEWS: CPT | Mod: LT

## 2018-11-30 RX ORDER — LOSARTAN POTASSIUM 100 MG/1
TABLET, FILM COATED ORAL
Refills: 0 | Status: ACTIVE | COMMUNITY

## 2018-12-21 ENCOUNTER — APPOINTMENT (OUTPATIENT)
Dept: ORTHOPEDIC SURGERY | Facility: CLINIC | Age: 63
End: 2018-12-21
Payer: COMMERCIAL

## 2018-12-21 VITALS
WEIGHT: 200 LBS | HEIGHT: 72 IN | DIASTOLIC BLOOD PRESSURE: 76 MMHG | HEART RATE: 84 BPM | SYSTOLIC BLOOD PRESSURE: 135 MMHG | BODY MASS INDEX: 27.09 KG/M2

## 2018-12-21 PROCEDURE — 73502 X-RAY EXAM HIP UNI 2-3 VIEWS: CPT | Mod: LT

## 2018-12-21 PROCEDURE — 99024 POSTOP FOLLOW-UP VISIT: CPT

## 2018-12-24 ENCOUNTER — OTHER (OUTPATIENT)
Age: 63
End: 2018-12-24

## 2018-12-24 RX ORDER — ENOXAPARIN SODIUM 100 MG/ML
40 INJECTION SUBCUTANEOUS
Qty: 12 | Refills: 0 | Status: ACTIVE | COMMUNITY
Start: 2018-11-16

## 2018-12-24 RX ORDER — OXYCODONE 5 MG/1
5 TABLET ORAL
Qty: 20 | Refills: 0 | Status: ACTIVE | COMMUNITY
Start: 2018-11-16

## 2018-12-24 RX ORDER — LOSARTAN POTASSIUM AND HYDROCHLOROTHIAZIDE 25; 100 MG/1; MG/1
100-25 TABLET ORAL
Qty: 90 | Refills: 0 | Status: ACTIVE | COMMUNITY
Start: 2018-11-14

## 2019-02-01 ENCOUNTER — APPOINTMENT (OUTPATIENT)
Dept: ORTHOPEDIC SURGERY | Facility: CLINIC | Age: 64
End: 2019-02-01
Payer: COMMERCIAL

## 2019-02-01 VITALS
HEIGHT: 72 IN | WEIGHT: 200 LBS | BODY MASS INDEX: 27.09 KG/M2 | DIASTOLIC BLOOD PRESSURE: 86 MMHG | SYSTOLIC BLOOD PRESSURE: 132 MMHG | HEART RATE: 93 BPM

## 2019-02-01 DIAGNOSIS — Z96.642 PRESENCE OF LEFT ARTIFICIAL HIP JOINT: ICD-10-CM

## 2019-02-01 DIAGNOSIS — Z47.1 AFTERCARE FOLLOWING JOINT REPLACEMENT SURGERY: ICD-10-CM

## 2019-02-01 DIAGNOSIS — Z96.642 AFTERCARE FOLLOWING JOINT REPLACEMENT SURGERY: ICD-10-CM

## 2019-02-01 PROCEDURE — 73502 X-RAY EXAM HIP UNI 2-3 VIEWS: CPT | Mod: LT

## 2019-02-01 PROCEDURE — 99024 POSTOP FOLLOW-UP VISIT: CPT

## 2019-02-01 NOTE — ADDENDUM
[FreeTextEntry1] : I, Yohan Jenkins, acted solely as a scribe for Dr. Salvatore Manzano on this date 02/01/2019 .

## 2019-02-01 NOTE — HISTORY OF PRESENT ILLNESS
[Doing Well] : is doing well [Excellent Pain Control] : has excellent pain control [No Sign of Infection] : is showing no signs of infection [___ Weeks Post Op] : [unfilled] weeks post op [Xray (Date:___)] : [unfilled] Xray -  [Clean/Dry/Intact] : clean, dry and intact [Healed] : healed [Chills] : no chills [Constipation] : no constipation [Diarrhea] : no diarrhea [Dysuria] : no dysuria [Fever] : no fever [Nausea] : no nausea [Vomiting] : no vomiting [Erythema] : not erythematous [Discharge] : absent of discharge [Swelling] : not swollen [Dehiscence] : not dehisced [de-identified] : S/P Left hip replacement DOS:11/13/18.  [de-identified] : 63 year old male presents S/P Left hip replacement DOS:11/13/18. He is currently progressing well. Pain well controlled.   He states that he has an a crepitus sensation in his posterior left hip, particularly when ascending stairs, however it is painless.  [de-identified] : Exam of the left hip shows a well healed incision with no signs of infection, FROM, mildly antalgic gait without a cane, equal leg lengths  [de-identified] : 3V xray of the  left hip and pelvis done in office today and reviewed by Dr. Salvatore Manzano demonstrates s/p left hip replacement with implants in good positioning, no sign of wear, loosening or subsidence.  [de-identified] : 63 year old male presents S/P Left hip replacement DOS:11/13/18. He is progressing well at this time and is happy with his surgical result. Xrays were reviewed and the patient was reassured that their THR components are in good position, with no signs of loosening or wear.  I discussed the importance of antibiotic use with any dental work. Dislocation precautions were discussed. Follow-up in one year.

## 2019-02-05 ENCOUNTER — OTHER (OUTPATIENT)
Age: 64
End: 2019-02-05

## 2019-08-09 NOTE — PHYSICAL THERAPY INITIAL EVALUATION ADULT - ADDITIONAL COMMENTS
per patient he does not own any DME. He has 3 steps to enter the home with a single hand rail. Cyclosporine Counseling:  I discussed with the patient the risks of cyclosporine including but not limited to hypertension, gingival hyperplasia,myelosuppression, immunosuppression, liver damage, kidney damage, neurotoxicity, lymphoma, and serious infections. The patient understands that monitoring is required including baseline blood pressure, CBC, CMP, lipid panel and uric acid, and then 1-2 times monthly CMP and blood pressure.

## 2023-07-05 NOTE — PRE-OP CHECKLIST - AS BP NONINV METHOD
Called left V/M to contact ray in regards to an earlier appointment per surgeons request. 3rd attempt to reach patient.    electronic